# Patient Record
Sex: FEMALE | Race: WHITE | Employment: FULL TIME | URBAN - METROPOLITAN AREA
[De-identification: names, ages, dates, MRNs, and addresses within clinical notes are randomized per-mention and may not be internally consistent; named-entity substitution may affect disease eponyms.]

---

## 2017-11-15 PROBLEM — E55.9 VITAMIN D DEFICIENCY: Status: ACTIVE | Noted: 2017-11-15

## 2017-11-15 PROBLEM — F33.1 MODERATE EPISODE OF RECURRENT MAJOR DEPRESSIVE DISORDER (HCC): Status: ACTIVE | Noted: 2017-11-15

## 2018-06-26 PROCEDURE — 87624 HPV HI-RISK TYP POOLED RSLT: CPT | Performed by: FAMILY MEDICINE

## 2018-06-26 PROCEDURE — 88175 CYTOPATH C/V AUTO FLUID REDO: CPT | Performed by: FAMILY MEDICINE

## 2018-06-26 PROCEDURE — 87625 HPV TYPES 16 & 18 ONLY: CPT | Performed by: FAMILY MEDICINE

## 2019-02-13 PROCEDURE — 87086 URINE CULTURE/COLONY COUNT: CPT | Performed by: FAMILY MEDICINE

## 2019-02-13 PROCEDURE — 87088 URINE BACTERIA CULTURE: CPT | Performed by: FAMILY MEDICINE

## 2019-02-13 PROCEDURE — 87186 SC STD MICRODIL/AGAR DIL: CPT | Performed by: FAMILY MEDICINE

## 2020-02-23 PROBLEM — F17.210 CIGARETTE NICOTINE DEPENDENCE WITHOUT COMPLICATION: Status: ACTIVE | Noted: 2020-02-23

## 2020-02-23 PROBLEM — I71.20 THORACIC AORTIC ANEURYSM WITHOUT RUPTURE (HCC): Status: ACTIVE | Noted: 2020-02-23

## 2020-02-23 PROBLEM — E78.00 PURE HYPERCHOLESTEROLEMIA: Status: ACTIVE | Noted: 2020-02-23

## 2020-02-23 PROBLEM — I71.2 THORACIC AORTIC ANEURYSM WITHOUT RUPTURE (HCC): Status: ACTIVE | Noted: 2020-02-23

## 2020-07-22 PROBLEM — Z79.899 ENCOUNTER FOR LONG-TERM (CURRENT) USE OF HIGH-RISK MEDICATION: Status: ACTIVE | Noted: 2020-07-22

## 2020-07-22 PROBLEM — M05.79 RHEUMATOID ARTHRITIS INVOLVING MULTIPLE SITES WITH POSITIVE RHEUMATOID FACTOR (HCC): Status: ACTIVE | Noted: 2020-07-22

## 2021-12-03 PROBLEM — R10.31 RIGHT GROIN PAIN: Status: ACTIVE | Noted: 2021-12-03

## 2023-09-22 RX ORDER — VITAMIN B COMPLEX
1 TABLET ORAL DAILY
COMMUNITY

## 2023-09-22 RX ORDER — LEFLUNOMIDE 10 MG/1
10 TABLET ORAL DAILY
COMMUNITY

## 2023-09-22 RX ORDER — AMLODIPINE BESYLATE 2.5 MG/1
2.5 TABLET ORAL DAILY
COMMUNITY

## 2023-09-22 RX ORDER — CLOPIDOGREL BISULFATE 75 MG/1
75 TABLET ORAL DAILY
COMMUNITY

## 2023-09-22 RX ORDER — ROSUVASTATIN CALCIUM 20 MG/1
20 TABLET, COATED ORAL NIGHTLY
COMMUNITY

## 2023-09-22 RX ORDER — CHOLECALCIFEROL (VITAMIN D3) 125 MCG
2000 CAPSULE ORAL DAILY
COMMUNITY

## 2023-09-22 RX ORDER — MAGNESIUM GLYCINATE 100 MG
1 TABLET ORAL DAILY
COMMUNITY

## 2023-09-22 RX ORDER — ASPIRIN 81 MG/1
81 TABLET, CHEWABLE ORAL DAILY
COMMUNITY

## 2023-09-22 NOTE — PAT NURSING NOTE
Per PAT encounter/[employer] message sent to pt:    Preprocedure Instructions     Visitor Instructions     Adult Patients: 2 Adult Care Partners can accompany the patient day of procedure. 2 Care Partners may visit [phone] during inpatient stay     PreOp Instructions     You are scheduled for: a Cardiac Procedure     Date of Procedure: 09/26/23 Tuesday     Diet Instructions: Do not eat or drink anything after midnight     Medications: Take an Aspirin 81 mg tablet the day of your procedure; Take Plavix 75mg the day of your procedure;Medications you are allowed to take can be taken with a sip of water the morning of your procedure     Medications to Stop: Hold herbal supplements and vitamins     Other Medications: Do NOT take your Chlorthalidone dose on Tuesday 9/26 morning of the procedure. Skin Prep: Shower with antibacterial soap using a clean washcloth, prior to procedure     Arrival Time: You will receive a call the afternoon (Monday) before your procedure after 3 pm on what time you should arrive the day of your procedure    Driving After Procedure: If sedation is given, you WILL NOT be able to drive home. You will need a responsible adult  to drive you home. ;Cannot take uber or cab unless approved by physician     Discharge Teaching: Your nurse will give you specific instructions before discharge; Any questions, please call the physician's office; Most people can resume normal activities in 2-3 days      parking is available starting at 6 am or park in the [facility] at [address] [place] [address] in at the [place] reception desk. Our  will be there to check you in for your procedure. Please bring your insurance cards and ID with you. Please DO NOT respond to this message, the inbasket is not monitored for messages. For any questions, please call the physician's office.

## 2023-09-26 ENCOUNTER — HOSPITAL ENCOUNTER (OUTPATIENT)
Dept: INTERVENTIONAL RADIOLOGY/VASCULAR | Facility: HOSPITAL | Age: 63
Discharge: HOME | End: 2023-09-27
Attending: INTERNAL MEDICINE | Admitting: INTERNAL MEDICINE
Payer: COMMERCIAL

## 2023-09-26 ENCOUNTER — APPOINTMENT (OUTPATIENT)
Dept: CV DIAGNOSTICS | Facility: HOSPITAL | Age: 63
End: 2023-09-26
Attending: INTERNAL MEDICINE
Payer: COMMERCIAL

## 2023-09-26 DIAGNOSIS — I25.10 CAD (CORONARY ARTERY DISEASE): ICD-10-CM

## 2023-09-26 LAB
ATRIAL RATE: 62 BPM
ATRIAL RATE: 68 BPM
GLUCOSE BLD-MCNC: 112 MG/DL (ref 70–99)
P AXIS: 51 DEGREES
P AXIS: 67 DEGREES
P-R INTERVAL: 158 MS
P-R INTERVAL: 160 MS
Q-T INTERVAL: 460 MS
Q-T INTERVAL: 482 MS
QRS DURATION: 110 MS
QRS DURATION: 110 MS
QTC CALCULATION (BEZET): 489 MS
QTC CALCULATION (BEZET): 489 MS
R AXIS: 72 DEGREES
R AXIS: 79 DEGREES
T AXIS: 58 DEGREES
T AXIS: 61 DEGREES
VENTRICULAR RATE: 62 BPM
VENTRICULAR RATE: 68 BPM

## 2023-09-26 PROCEDURE — 93010 ELECTROCARDIOGRAM REPORT: CPT | Performed by: INTERNAL MEDICINE

## 2023-09-26 PROCEDURE — 82962 GLUCOSE BLOOD TEST: CPT

## 2023-09-26 PROCEDURE — 027034Z DILATION OF CORONARY ARTERY, ONE ARTERY WITH DRUG-ELUTING INTRALUMINAL DEVICE, PERCUTANEOUS APPROACH: ICD-10-PCS | Performed by: INTERNAL MEDICINE

## 2023-09-26 PROCEDURE — 93308 TTE F-UP OR LMTD: CPT | Performed by: INTERNAL MEDICINE

## 2023-09-26 PROCEDURE — 93005 ELECTROCARDIOGRAM TRACING: CPT

## 2023-09-26 PROCEDURE — 99152 MOD SED SAME PHYS/QHP 5/>YRS: CPT | Performed by: INTERNAL MEDICINE

## 2023-09-26 RX ORDER — SODIUM CHLORIDE 9 MG/ML
INJECTION, SOLUTION INTRAVENOUS CONTINUOUS
Status: ACTIVE | OUTPATIENT
Start: 2023-09-26 | End: 2023-09-26

## 2023-09-26 RX ORDER — ASPIRIN 81 MG/1
81 TABLET ORAL DAILY
Status: DISCONTINUED | OUTPATIENT
Start: 2023-09-27 | End: 2023-09-27

## 2023-09-26 RX ORDER — ROSUVASTATIN CALCIUM 20 MG/1
20 TABLET, COATED ORAL NIGHTLY
Status: DISCONTINUED | OUTPATIENT
Start: 2023-09-26 | End: 2023-09-27

## 2023-09-26 RX ORDER — MIDAZOLAM HYDROCHLORIDE 1 MG/ML
INJECTION INTRAMUSCULAR; INTRAVENOUS
Status: COMPLETED
Start: 2023-09-26 | End: 2023-09-26

## 2023-09-26 RX ORDER — NITROGLYCERIN 20 MG/100ML
INJECTION INTRAVENOUS
Status: COMPLETED
Start: 2023-09-26 | End: 2023-09-26

## 2023-09-26 RX ORDER — ACETAMINOPHEN 325 MG/1
650 TABLET ORAL EVERY 6 HOURS PRN
Status: DISCONTINUED | OUTPATIENT
Start: 2023-09-26 | End: 2023-09-27

## 2023-09-26 RX ORDER — ACETAMINOPHEN 325 MG/1
TABLET ORAL
Status: COMPLETED
Start: 2023-09-26 | End: 2023-09-26

## 2023-09-26 RX ORDER — CLOPIDOGREL BISULFATE 75 MG/1
75 TABLET ORAL DAILY
Status: DISCONTINUED | OUTPATIENT
Start: 2023-09-27 | End: 2023-09-27

## 2023-09-26 RX ORDER — VERAPAMIL HYDROCHLORIDE 2.5 MG/ML
INJECTION, SOLUTION INTRAVENOUS
Status: COMPLETED
Start: 2023-09-26 | End: 2023-09-26

## 2023-09-26 RX ORDER — SODIUM CHLORIDE 9 MG/ML
INJECTION, SOLUTION INTRAVENOUS
Status: COMPLETED | OUTPATIENT
Start: 2023-09-27 | End: 2023-09-27

## 2023-09-26 RX ORDER — HEPARIN SODIUM 5000 [USP'U]/ML
INJECTION, SOLUTION INTRAVENOUS; SUBCUTANEOUS
Status: COMPLETED
Start: 2023-09-26 | End: 2023-09-26

## 2023-09-26 RX ORDER — LIDOCAINE HYDROCHLORIDE 10 MG/ML
INJECTION, SOLUTION EPIDURAL; INFILTRATION; INTRACAUDAL; PERINEURAL
Status: COMPLETED
Start: 2023-09-26 | End: 2023-09-26

## 2023-09-26 RX ORDER — PROTAMINE SULFATE 10 MG/ML
INJECTION, SOLUTION INTRAVENOUS
Status: COMPLETED
Start: 2023-09-26 | End: 2023-09-26

## 2023-09-26 RX ORDER — CLOPIDOGREL BISULFATE 75 MG/1
TABLET ORAL
Status: COMPLETED
Start: 2023-09-26 | End: 2023-09-26

## 2023-09-26 RX ADMIN — ACETAMINOPHEN 650 MG: 325 TABLET ORAL at 14:25:00

## 2023-09-26 RX ADMIN — ROSUVASTATIN CALCIUM 20 MG: 20 TABLET, COATED ORAL at 22:42:00

## 2023-09-26 RX ADMIN — SODIUM CHLORIDE: 9 INJECTION, SOLUTION INTRAVENOUS at 09:19:00

## 2023-09-26 RX ADMIN — ACETAMINOPHEN 650 MG: 325 TABLET ORAL at 22:43:00

## 2023-09-26 NOTE — DISCHARGE INSTRUCTIONS
HOME CARE INSTRUCTIONS FOLLOWING CORONARY ANGIOGRAPHY, PERIPHERAL ANGIOGRAPHY, ANGIOPLASTY (PTCA/PTA) OR INSERTION OF STENT IN THE CORONARY, CAROTID, AND/OR PERIPHERAL ARTERIES    ACTIVITY:  ~  DO NOT drive after the procedure. You may resume driving on Thursday.   ~  Plan on resting and relaxing tonight and tomorrow. You may resume your normal activity as tolerated after 48 hours. ~  Do not lift anything heavy over 10 pounds for the next 48 hours and 20 pounds for a week for groin access. ~ Avoid sexual activity for the next 24 hours. ~ Avoid drinking alcohol for the next 24 hours.   ~ If the groin site was used, avoid repeated stair use and excessive walking for the next 24 hours. WHAT IS NORMAL?  ~ A small lump at the procedure site associated with mild tenderness when touched  ~ The procedure site may be bruised or discolored. ~ There may be a small amount of drainage on the bandage. SPECIAL INSTRUCTIONS:   ~ Drink plenty of fluids during the next 24-48 hours to \"flush\" the contrast from your system. ~ The bandage is to remain in place for 24 hours.  ~ Keep the bandage clean and dry.  ~ DO NOT submerge the procedure site for 72 hours (NO tub baths or pools) This includes dishwashing submersion if the wrist was used. ~ After 24 hours, you MUST remove the bandage.  ~ You should shower AFTER removing the bandage and wash the procedure site GENTLY with soap and water. You do not have to cover it up. You may apply a bandaid for 24 hours if you prefer. WHEN YOU SHOULD NOTIFY YOUR PHYSICIAN:  ~ Bleeding can occur at the procedure site. Both on the outside of the skin and/or beneath the surface of the skin. ~ Swelling or a large lump at the procedure site can occur, which may be accompanied by moderate to severe pain. IF EITHER OF THE ABOVE OCCURS, LIE DOWN FLAT, AND HAVE SOMEONE APPLY PRESSURE TO THE PROCEDURE SITE WITH BOTH HANDS AS INSTRUCTED BY YOUR NURSE.  HOLD PRESSURE FOR 20 MINUTES AND THE BLEEDING SHOULD STOP. NOTIFY YOUR PHYSICIAN OF THE OCCURRENCE. IF THE BLEEDING DOES NOT STOP, CALL 911 AND CONTINUE TO APPLY PRESSURE     ~ If you experience signs of a fever, temperature greater than 101, chills, infection (redness, swelling, thick yellow drainage, or a foul odor from procedure site)   ~ If you notice any numbness, tingling or loss of feeling to your leg, foot  for groin access and loss of feeling to your fingers, or hand if the wrist was used. IF YOU RECEIVED A STENT:    ~ You will remain on an antiplatelet drug and/or aspirin. Antiplatelet medications are usually taken for 6 mons to a year and you SHOULD NOT STOP TAKING THEM UNLESS YOUR CARDIOLOGIST DIRECTS YOU TO DO SO. These medications help to prevent blockage at the stent site. If another physician or dentist asks you to stop your antiplatelet medication, YOU NEED TO CONSULT YOUR CARDIOLOGIST FIRST. Together, your cardiologist and other physician can discuss the risks that may be involved if you are not taking the antiplatelet medication. ~If an MRI is necessary, it may be done 4-6 weeks after your procedure. Verify this with your cardiologist.  ~ [address]. A duplicate card CANNOT be reproduced. If you need an MRI, your stent card will need to be shown to the technologist before performing the MRI.   ~ IF you have any issues or concerns for TONIGHT ONLY, you may call the nurse on call at [phone]. After tonight, follow up with your MDs office. OTHER:   ~ You may resume your present diet and medications as prescribed unless otherwise directed by your physician.   ~ Follow up with Dr Elmira Dolan as scheduled.

## 2023-09-26 NOTE — PROGRESS NOTES
Rc'd pt from cath lab s/p PCI to circ in stable condition. VSS. Perclose to right groin is soft, clean and dry. No bleeding or hematoma. Pt denies c/o pain or discomfort. Pts  at bedside. 12L EKG done. Cardiac rehab notified. 13:00: Went in pts room to do a groin assessment. Pt stated \"I feel weird. \" Pt could not get the words out to describe what was wrong. Diaphoretic. Non responsive. Pt has a pulse. BP 32/20. HR 50's sinus tali. IVF running with a pressure bag. Non rebreather applied. 12L EKG done. Code called. Dr Allie Edmonds at bedside. Pt woke up and says \"I feel fine, I felt a wave come upon me. \" Sys BP up in the 90's. Echo st bedside. Pt to be admitted. 14:15: Pt was incontinent of urine during episode. Pt changed. Pt sitting up and tolerating po intake well. Pt c/o pain right groin. Site is stable. Tylenol given. 16:00: Pt still c/o pain right lower quadrant, worse upon palpation. It is slightly hard in that area, but not around the groin site. Pt ambulated in hallway and tolerated well. No further increase in pain. 16:20: Report called to Sy Reynoso RN. Pt transported via w/c to 2606 in stable condition.

## 2023-09-26 NOTE — PROCEDURES
[address]    PATIENT'S NAME: [facility]   ATTENDING PHYSICIAN: Harman Field. Arian Escoto MD   OPERATING PHYSICIAN: Harman Field. Arian Escoto MD   PATIENT ACCOUNT#:   [id number]    LOCATION:  [address]  MEDICAL RECORD #:   [id number]       YOB: 1960  ADMISSION DATE:       09/26/2023      OPERATION DATE:  09/26/2023    CARDIAC PROCEDURE TRANSCRIPTION      CARDIAC CATHETERIZATION/PERCUTANEOUS CORONARY INTERVENTION     PREOPERATIVE DIAGNOSIS:  Coronary artery disease. POSTOPERATIVE DIAGNOSIS:  Coronary artery disease. PROCEDURE PERFORMED:    1. Left coronary angiography. 2.   Percutaneous coronary intervention to circumflex with drug-eluting stent. 3.   Intracoronary nitroglycerin administration for vasospasm. SEDATION:  We gave 6 mg of Versed and 100 mcg of fentanyl from 1102 until 1123 under my direct supervision with independent nursing monitoring by the catheterization lab staff. PROCEDURAL DETAILS:  Informed consent was obtained. We used right femoral arterial access in the standard fashion and we did a femoral angiogram.  We used a 6-Czech Slender sheath and we used a 6-Czech EBU 3.5 guiding catheter. We used a BMW wire and pre-dilated and measured with a 2.0 mm balloon. We stented with a 3.0 x 30 Naresh drug-eluting stent at 14 atmospheres and then postdilated with a 3.5 mm noncompliant balloon, making several inflations throughout the stent length up to 16 atmospheres. We had an excellent result. We gave nitroglycerin for residual vasospasm distally and we took final angiographic pictures. We ended the case and we used a Perclose device for hemostasis. COMPLICATIONS:  None. CONCLUSIONS:  A 58-year-old with CAD. The patient had the circumflex treated today for severe disease. The patient now has been revascularized in her diagonal and her circumflex. These were the 2 tightest (most severe) lesions she had.   There is moderate disease in the LAD with approximately 50% plaquing noted. At this point the patient will need aggressive medical management of underlying atherosclerosis and will need dual-antiplatelet therapy for a year. The patient might respond well to nitrates given her vasospasm and history of smoking. Nevertheless, if she is asymptomatic with PCI alone, she does not need nitrates. The patient will follow with Dr. Luis Manuel Hopkins for ongoing evaluation and therapy. Dictated By Syd Molina MD  d: 09/26/2023 11:39:01  t: 09/26/2023 12:39:10  [facility] [id number]  [id number]

## 2023-09-26 NOTE — PROGRESS NOTES
Cards    PCI to CX. 3.0x30mm [place]. Posted with 3.5mm NC. Excellent result. Vessels grew significantly with NTG. Perclose to RCFA. Ooze so gave 20mg protamine. PLAN: Ok to DC home if groin stable and ambulating this afternoon. DAPT (already on). FU with Steve in 1-2 weeks. Might consider a nitrate if any symptoms given spasm.     [facility] MD

## 2023-09-27 ENCOUNTER — APPOINTMENT (OUTPATIENT)
Dept: ULTRASOUND IMAGING | Facility: HOSPITAL | Age: 63
End: 2023-09-27
Attending: NURSE PRACTITIONER
Payer: COMMERCIAL

## 2023-09-27 VITALS
SYSTOLIC BLOOD PRESSURE: 147 MMHG | DIASTOLIC BLOOD PRESSURE: 86 MMHG | BODY MASS INDEX: 35.76 KG/M2 | TEMPERATURE: 98 F | WEIGHT: 182.13 LBS | HEART RATE: 74 BPM | OXYGEN SATURATION: 92 % | HEIGHT: 60 IN | RESPIRATION RATE: 19 BRPM

## 2023-09-27 LAB
ANION GAP SERPL CALC-SCNC: 3 MMOL/L (ref 0–18)
BUN BLD-MCNC: 9 MG/DL (ref 7–18)
CALCIUM BLD-MCNC: 8.6 MG/DL (ref 8.5–10.1)
CHLORIDE SERPL-SCNC: 108 MMOL/L (ref 98–112)
CO2 SERPL-SCNC: 28 MMOL/L (ref 21–32)
CREAT BLD-MCNC: 0.51 MG/DL
EGFRCR SERPLBLD CKD-EPI 2021: 105 ML/MIN/1.73M2 (ref 60–?)
ERYTHROCYTE [DISTWIDTH] IN BLOOD BY AUTOMATED COUNT: 12.4 %
GLUCOSE BLD-MCNC: 87 MG/DL (ref 70–99)
HCT VFR BLD AUTO: 34.5 %
HGB BLD-MCNC: 11.4 G/DL
MCH RBC QN AUTO: 30.8 PG (ref 26–34)
MCHC RBC AUTO-ENTMCNC: 33 G/DL (ref 31–37)
MCV RBC AUTO: 93.2 FL
OSMOLALITY SERPL CALC.SUM OF ELEC: 286 MOSM/KG (ref 275–295)
PLATELET # BLD AUTO: 220 10(3)UL (ref 150–450)
POTASSIUM SERPL-SCNC: 3.6 MMOL/L (ref 3.5–5.1)
RBC # BLD AUTO: 3.7 X10(6)UL
SODIUM SERPL-SCNC: 139 MMOL/L (ref 136–145)
WBC # BLD AUTO: 6.3 X10(3) UL (ref 4–11)

## 2023-09-27 PROCEDURE — 99211 OFF/OP EST MAY X REQ PHY/QHP: CPT

## 2023-09-27 PROCEDURE — 85027 COMPLETE CBC AUTOMATED: CPT | Performed by: INTERNAL MEDICINE

## 2023-09-27 PROCEDURE — 76882 US LMTD JT/FCL EVL NVASC XTR: CPT | Performed by: NURSE PRACTITIONER

## 2023-09-27 PROCEDURE — 80048 BASIC METABOLIC PNL TOTAL CA: CPT | Performed by: INTERNAL MEDICINE

## 2023-09-27 RX ORDER — AMLODIPINE BESYLATE 2.5 MG/1
2.5 TABLET ORAL DAILY
Status: DISCONTINUED | OUTPATIENT
Start: 2023-09-27 | End: 2023-09-27

## 2023-09-27 RX ORDER — CHLORTHALIDONE 25 MG/1
25 TABLET ORAL DAILY
Status: DISCONTINUED | OUTPATIENT
Start: 2023-09-27 | End: 2023-09-27

## 2023-09-27 RX ORDER — POTASSIUM CHLORIDE 20 MEQ/1
40 TABLET, EXTENDED RELEASE ORAL EVERY 4 HOURS
Status: COMPLETED | OUTPATIENT
Start: 2023-09-27 | End: 2023-09-27

## 2023-09-27 RX ORDER — LOSARTAN POTASSIUM 100 MG/1
100 TABLET ORAL DAILY
Status: DISCONTINUED | OUTPATIENT
Start: 2023-09-27 | End: 2023-09-27

## 2023-09-27 RX ADMIN — POTASSIUM CHLORIDE 40 MEQ: 20 TABLET, EXTENDED RELEASE ORAL at 08:59:00

## 2023-09-27 RX ADMIN — CHLORTHALIDONE 25 MG: 25 TABLET ORAL at 10:21:00

## 2023-09-27 RX ADMIN — LOSARTAN POTASSIUM 100 MG: 100 TABLET ORAL at 10:15:00

## 2023-09-27 RX ADMIN — POTASSIUM CHLORIDE 40 MEQ: 20 TABLET, EXTENDED RELEASE ORAL at 13:01:00

## 2023-09-27 RX ADMIN — CLOPIDOGREL BISULFATE 75 MG: 75 TABLET ORAL at 08:57:00

## 2023-09-27 RX ADMIN — AMLODIPINE BESYLATE 2.5 MG: 2.5 TABLET ORAL at 10:15:00

## 2023-09-27 RX ADMIN — ASPIRIN 81 MG: 81 TABLET ORAL at 08:56:00

## 2023-09-27 NOTE — PLAN OF CARE
Pt is ok to discharge per primary and consults. Discharge instructions including meds & follow ups given. Patient verbalizes understanding & questions answered. IV removed, tele monitor discontinued, all belongings taken with patient. R groin incision, dressing in place, soft, no hematoma. Pt transported off unit via wheelchair to [facility].

## 2023-09-27 NOTE — DIETARY NOTE
Clinical Nutrition    Dietitian consult received per cardiac rehab standing order. Pt to be educated by cardiac rehab staff and encouraged to attend outpatient classes taught by SUE. RD available PRN.     Beau Mckenna MS, RD, LDN  Clinical Dietitian  Pager #: [phone]

## 2023-09-27 NOTE — PROGRESS NOTES
Patient alert and oriented x4. Resp. Regular and easy. On RA. SR in tele. Tylenol prn given for slight tenderness to R groin area. S/p cath. R groin w/ gauze and tegaderm. Site soft, no bleeding, no hematoma noted. 2+ pedal pulses. Up to BR SBA. Continent. Needs attended promptly.

## 2023-09-27 NOTE — CARDIAC REHAB
Cardiac rehab staff saw patient for education post PCI. Stent card given to patient, education and DC instructions reviewed with patient. Fransico Zurita prefers to do phase 2 CR closer to home and will call [facility] to schedule.

## 2023-09-27 NOTE — PLAN OF CARE
Received patient at 0700. Pt AOx4, bilateral lung sounds clear, on room air. Normal sinus rhythm. R groin site clean, dry, intact - slight firmness palpated above site. Patient reports pain above site in lower right abdomen. Cards notified, US ordered. Pt is continent of bowel and bladder; last bm 9/27/23. Plan for [address] and discharge reviewed with patient. Call light within reach and questions answered.          Problem: Patient/Family Goals  Goal: Patient/Family Long Term Goal  Description: Patient's Long Term Goal 9/27: Stay out of the hospital  Interventions:  - Med compliance  - attend follow up appointments   - See additional Care Plan goals for specific interventions  Outcome: Progressing  Goal: Patient/Family Short Term Goal  Description: Patient's Short Term Goal 9/27/23: get ultrasound of groin    Interventions:   - review US with md  - discharge   - See additional Care Plan goals for specific interventions  Outcome: Progressing

## 2023-09-29 NOTE — PAYOR COMM NOTE
--------------  DISCHARGE REVIEW    Payor: [address] #:  [id number]  Authorization Number: [id number]    Admit date: N/A  Admit time:  N/A  Discharge Date: 9/27/2023  6:10 PM     Admitting Physician: Jessika Rooney MD  Attending Physician:  No att. providers found  Primary Care Physician: Nataliia Colbert MD       Discharge Summary Notes    No notes of this type exist for this encounter.

## (undated) NOTE — LETTER
September 27, 2023       Etelvina GUZMÁN  [facility] [place] [address]       To Whom It May Concern:    Xochitl Kidd has been under our care regarding ongoing medical issues. Because of this, she has been required to restrict her physical activities. She may resume her usual activities, including work, on 10/2/2023 with the following restrictions:    []  None     [x]    No heavy lifting (over 15 pounds) for   1            weeks   []    Part-time (no more than             hours per week) for               week   []  Other:        Please feel free to contact us if there are any questions.       Sincerely,      JASE Shah  [address] [facility]  [facility]   [address]  [facility] [phone]    Document generated by:  Esperanza Mcclure NP